# Patient Record
Sex: FEMALE | Race: BLACK OR AFRICAN AMERICAN | NOT HISPANIC OR LATINO | URBAN - METROPOLITAN AREA
[De-identification: names, ages, dates, MRNs, and addresses within clinical notes are randomized per-mention and may not be internally consistent; named-entity substitution may affect disease eponyms.]

---

## 2024-03-04 ENCOUNTER — OV NP (OUTPATIENT)
Dept: URBAN - METROPOLITAN AREA CLINIC 48 | Facility: CLINIC | Age: 44
End: 2024-03-04

## 2024-06-04 ENCOUNTER — OFFICE VISIT (OUTPATIENT)
Dept: URBAN - METROPOLITAN AREA CLINIC 48 | Facility: CLINIC | Age: 44
End: 2024-06-04
Payer: COMMERCIAL

## 2024-06-04 ENCOUNTER — LAB OUTSIDE AN ENCOUNTER (OUTPATIENT)
Dept: URBAN - METROPOLITAN AREA CLINIC 48 | Facility: CLINIC | Age: 44
End: 2024-06-04

## 2024-06-04 ENCOUNTER — DASHBOARD ENCOUNTERS (OUTPATIENT)
Age: 44
End: 2024-06-04

## 2024-06-04 VITALS
HEIGHT: 69 IN | OXYGEN SATURATION: 98 % | SYSTOLIC BLOOD PRESSURE: 129 MMHG | TEMPERATURE: 97.5 F | BODY MASS INDEX: 32.44 KG/M2 | HEART RATE: 74 BPM | DIASTOLIC BLOOD PRESSURE: 90 MMHG | WEIGHT: 219 LBS

## 2024-06-04 DIAGNOSIS — R14.0 ABDOMINAL BLOATING: ICD-10-CM

## 2024-06-04 DIAGNOSIS — R19.4 CHANGE IN BOWEL HABITS: ICD-10-CM

## 2024-06-04 DIAGNOSIS — D50.8 ACHLORHYDRIC ANEMIA: ICD-10-CM

## 2024-06-04 DIAGNOSIS — K21.9 GASTROESOPHAGEAL REFLUX DISEASE WITHOUT ESOPHAGITIS: ICD-10-CM

## 2024-06-04 PROBLEM — 116289008: Status: ACTIVE | Noted: 2024-06-04

## 2024-06-04 PROBLEM — 724556004: Status: ACTIVE | Noted: 2024-06-04

## 2024-06-04 PROBLEM — 88111009: Status: ACTIVE | Noted: 2024-06-04

## 2024-06-04 PROBLEM — 444551008: Status: ACTIVE | Noted: 2024-06-04

## 2024-06-04 PROBLEM — 266435005: Status: ACTIVE | Noted: 2024-06-04

## 2024-06-04 PROCEDURE — 99204 OFFICE O/P NEW MOD 45 MIN: CPT | Performed by: INTERNAL MEDICINE

## 2024-06-04 RX ORDER — AZELASTINE HYDROCHLORIDE 137 UG/1
1 PUFF IN EACH NOSTRIL SPRAY, METERED NASAL TWICE A DAY
Refills: 0 | Status: ACTIVE | COMMUNITY

## 2024-06-04 RX ORDER — DICYCLOMINE HYDROCHLORIDE 10 MG/1
1 CAPSULE 30 MINUTES BEFORE EATING CAPSULE ORAL THREE TIMES A DAY
Refills: 0 | Status: ACTIVE | COMMUNITY

## 2024-06-04 RX ORDER — HYDROXYZINE PAMOATE 25 MG/1
1 CAPSULE AT BEDTIME AS NEEDED CAPSULE ORAL ONCE A DAY
Refills: 0 | Status: ACTIVE | COMMUNITY

## 2024-06-04 RX ORDER — COLESTIPOL HYDROCHLORIDE 1 G/1
TAKE 1-2 TABLETS 2 HOURS AWAY FROM OTHER MEDICATION TABLET, FILM COATED ORAL ONCE A DAY
Qty: 60 | Refills: 3 | OUTPATIENT
Start: 2024-06-04

## 2024-06-04 RX ORDER — RIZATRIPTAN BENZOATE 10 MG/1
1 TABLET TABLET ORAL ONCE A DAY
Refills: 1 | Status: ACTIVE | COMMUNITY

## 2024-06-04 RX ORDER — IBUPROFEN 800 MG/1
1 TABLET WITH FOOD OR MILK AS NEEDED TABLET, FILM COATED ORAL
Refills: 0 | Status: ACTIVE | COMMUNITY

## 2024-06-04 RX ORDER — PROPRANOLOL HYDROCHLORIDE 60 MG/1
1 CAPSULE CAPSULE, EXTENDED RELEASE ORAL ONCE A DAY
Refills: 1 | Status: ACTIVE | COMMUNITY

## 2024-06-04 RX ORDER — ALBUTEROL SULFATE AND BUDESONIDE 90; 80 UG/1; UG/1
2 PUFFS AS NEEDED AEROSOL, METERED RESPIRATORY (INHALATION)
Refills: 1 | Status: ACTIVE | COMMUNITY

## 2024-06-04 RX ORDER — ESOMEPRAZOLE MAGNESIUM 40 MG/1
1 CAPSULE CAPSULE, DELAYED RELEASE ORAL ONCE A DAY
Qty: 30 | Refills: 3 | OUTPATIENT
Start: 2024-06-04

## 2024-06-04 RX ORDER — TRAMADOL HYDROCHLORIDE 50 MG/1
1 TABLET AS NEEDED TABLET ORAL ONCE A DAY
Refills: 0 | Status: ACTIVE | COMMUNITY

## 2024-06-04 RX ORDER — DULOXETINE HYDROCHLORIDE 30 MG/1
1 CAPSULE CAPSULE, DELAYED RELEASE PELLETS ORAL ONCE A DAY
Refills: 0 | Status: ACTIVE | COMMUNITY

## 2024-06-04 RX ORDER — SEMAGLUTIDE 1.34 MG/ML
AS DIRECTED INJECTION, SOLUTION SUBCUTANEOUS
Status: ACTIVE | COMMUNITY

## 2024-06-04 NOTE — HPI-TODAY'S VISIT:
43 year old female presents for evaluation of IBS and anemia. She is followed by hematology and received her last IV iron in 3/2024. Admits to heavy menses. 1/2024 iron 17, TIBC 426, iron sat 4%, ferritin 2. Hgb 11.2, MCV 87. Bowel movements are often urgent of loose stool and multiple times a day with some nocturnal incontinence. She takes Dicyclomine once daily and it helps. Certain foods cause an increase of symptoms. GERD is mostly controlled on Famotidine and Omeprazole. She also reports a vitamin D deficiency. The patient has been on Ozempic since 2021. She has stopped it in the past but issues continue. The patient has a history of GENNARO elevation and was seen by Dr. Kirkland/rheumatology. She was diagnosed with connective tissue disease. The patient has a history of fibromyalgia. Her aunt has a colostomy bag but otherwise denies GI disease in the family.

## 2024-06-12 ENCOUNTER — LAB OUTSIDE AN ENCOUNTER (OUTPATIENT)
Dept: URBAN - METROPOLITAN AREA CLINIC 46 | Facility: CLINIC | Age: 44
End: 2024-06-12

## 2024-06-14 LAB
ADENOVIRUS F 40/41: NOT DETECTED
CALPROTECTIN, STOOL - QDX: (no result)
CAMPYLOBACTER: NOT DETECTED
CLOSTRIDIUM DIFFICILE: NOT DETECTED
ENTAMOEBA HISTOLYTICA: NOT DETECTED
ENTEROAGGREGATIVE E.COLI: NOT DETECTED
ENTEROTOXIGENIC E.COLI: NOT DETECTED
ESCHERICHIA COLI O157: NOT DETECTED
GIARDIA LAMBLIA: NOT DETECTED
NOROVIRUS GI/GII: NOT DETECTED
PANCREATICELASTASE ELISA, STOOL: (no result)
ROTAVIRUS A: NOT DETECTED
SALMONELLA SPP.: NOT DETECTED
SHIGA-LIKE TOXIN PRODUCING E.COLI: NOT DETECTED
SHIGELLA SPP. / ENTEROINVASIVE E.COLI: NOT DETECTED
VIBRIO PARAHAEMOLYTICUS: NOT DETECTED
VIBRIO SPP.: NOT DETECTED
YERSINIA ENTEROCOLITICA: NOT DETECTED

## 2024-06-17 ENCOUNTER — TELEPHONE ENCOUNTER (OUTPATIENT)
Dept: URBAN - METROPOLITAN AREA CLINIC 46 | Facility: CLINIC | Age: 44
End: 2024-06-17

## 2024-06-17 ENCOUNTER — LAB OUTSIDE AN ENCOUNTER (OUTPATIENT)
Dept: URBAN - METROPOLITAN AREA CLINIC 46 | Facility: CLINIC | Age: 44
End: 2024-06-17

## 2024-06-17 PROBLEM — 37992001: Status: ACTIVE | Noted: 2024-06-17

## 2024-06-17 RX ORDER — PANCRELIPASE LIPASE, PANCRELIPASE PROTEASE, PANCRELIPASE AMYLASE 40000; 126000; 168000 [USP'U]/1; [USP'U]/1; [USP'U]/1
TAKE 2-3 WITH A MEAL AND 1 WITH A SNACK CAPSULE, DELAYED RELEASE ORAL DAILY
Qty: 300 | Refills: 5 | OUTPATIENT
Start: 2024-06-17 | End: 2024-12-13

## 2024-06-18 ENCOUNTER — TELEPHONE ENCOUNTER (OUTPATIENT)
Dept: URBAN - METROPOLITAN AREA CLINIC 46 | Facility: CLINIC | Age: 44
End: 2024-06-18

## 2024-06-27 ENCOUNTER — TELEPHONE ENCOUNTER (OUTPATIENT)
Dept: URBAN - METROPOLITAN AREA CLINIC 45 | Facility: CLINIC | Age: 44
End: 2024-06-27

## 2024-08-12 ENCOUNTER — OFFICE VISIT (OUTPATIENT)
Dept: URBAN - METROPOLITAN AREA TELEHEALTH 2 | Facility: TELEHEALTH | Age: 44
End: 2024-08-12
Payer: COMMERCIAL

## 2024-08-12 ENCOUNTER — LAB OUTSIDE AN ENCOUNTER (OUTPATIENT)
Dept: URBAN - METROPOLITAN AREA TELEHEALTH 2 | Facility: TELEHEALTH | Age: 44
End: 2024-08-12

## 2024-08-12 VITALS — BODY MASS INDEX: 31.84 KG/M2 | HEIGHT: 69 IN | WEIGHT: 215 LBS

## 2024-08-12 DIAGNOSIS — K86.89 PANCREATIC INSUFFICIENCY: ICD-10-CM

## 2024-08-12 DIAGNOSIS — R14.0 ABDOMINAL BLOATING: ICD-10-CM

## 2024-08-12 DIAGNOSIS — K21.9 GASTROESOPHAGEAL REFLUX DISEASE WITHOUT ESOPHAGITIS: ICD-10-CM

## 2024-08-12 DIAGNOSIS — R19.4 CHANGE IN BOWEL HABITS: ICD-10-CM

## 2024-08-12 PROBLEM — 21101000119105: Status: ACTIVE | Noted: 2024-08-12

## 2024-08-12 PROCEDURE — 99214 OFFICE O/P EST MOD 30 MIN: CPT | Performed by: INTERNAL MEDICINE

## 2024-08-12 PROCEDURE — 99443 PHONE E/M BY PHYS 21-30 MIN: CPT | Performed by: INTERNAL MEDICINE

## 2024-08-12 RX ORDER — PROPRANOLOL HYDROCHLORIDE 60 MG/1
1 CAPSULE CAPSULE, EXTENDED RELEASE ORAL ONCE A DAY
Refills: 1 | Status: ACTIVE | COMMUNITY

## 2024-08-12 RX ORDER — PANCRELIPASE LIPASE, PANCRELIPASE PROTEASE, PANCRELIPASE AMYLASE 40000; 126000; 168000 [USP'U]/1; [USP'U]/1; [USP'U]/1
TAKE 2-3 WITH A MEAL AND 1 WITH A SNACK CAPSULE, DELAYED RELEASE ORAL DAILY
Qty: 300 | Refills: 5 | Status: ON HOLD | COMMUNITY
Start: 2024-06-17 | End: 2024-12-13

## 2024-08-12 RX ORDER — IBUPROFEN 800 MG/1
1 TABLET WITH FOOD OR MILK AS NEEDED TABLET, FILM COATED ORAL
Refills: 0 | Status: ACTIVE | COMMUNITY

## 2024-08-12 RX ORDER — BUPROPION HYDROCHLORIDE 100 MG/1
1 TABLET IN THE MORNING TABLET, FILM COATED ORAL ONCE A DAY
Status: ACTIVE | COMMUNITY

## 2024-08-12 RX ORDER — ALBUTEROL SULFATE AND BUDESONIDE 90; 80 UG/1; UG/1
2 PUFFS AS NEEDED AEROSOL, METERED RESPIRATORY (INHALATION)
Refills: 1 | Status: ACTIVE | COMMUNITY

## 2024-08-12 RX ORDER — TRAMADOL HYDROCHLORIDE 50 MG/1
1 TABLET AS NEEDED TABLET ORAL ONCE A DAY
Refills: 0 | Status: ACTIVE | COMMUNITY

## 2024-08-12 RX ORDER — COLESTIPOL HYDROCHLORIDE 1 G/1
TAKE 1-2 TABLETS 2 HOURS AWAY FROM OTHER MEDICATION TABLET, FILM COATED ORAL ONCE A DAY
Qty: 60 | Refills: 3 | Status: ON HOLD | COMMUNITY
Start: 2024-06-04

## 2024-08-12 RX ORDER — SEMAGLUTIDE 1.34 MG/ML
AS DIRECTED INJECTION, SOLUTION SUBCUTANEOUS
Status: ACTIVE | COMMUNITY

## 2024-08-12 RX ORDER — HYDROXYZINE PAMOATE 25 MG/1
1 CAPSULE AT BEDTIME AS NEEDED CAPSULE ORAL ONCE A DAY
Refills: 0 | Status: ACTIVE | COMMUNITY

## 2024-08-12 RX ORDER — AZELASTINE HYDROCHLORIDE 137 UG/1
1 PUFF IN EACH NOSTRIL SPRAY, METERED NASAL TWICE A DAY
Refills: 0 | Status: ACTIVE | COMMUNITY

## 2024-08-12 RX ORDER — RIZATRIPTAN BENZOATE 10 MG/1
1 TABLET TABLET ORAL ONCE A DAY
Refills: 1 | Status: ACTIVE | COMMUNITY

## 2024-08-12 RX ORDER — ESOMEPRAZOLE MAGNESIUM 40 MG/1
1 CAPSULE CAPSULE, DELAYED RELEASE ORAL ONCE A DAY
Qty: 30 | Refills: 3 | Status: ACTIVE | COMMUNITY
Start: 2024-06-04

## 2024-08-12 NOTE — HPI-TODAY'S VISIT:
43 year old female presents for follow up of IBS and anemia. She is followed by hematology and received her last IV iron in 3/2024. Admits to heavy menses. 1/2024 iron 17, TIBC 426, iron sat 4%, ferritin 2. Hgb 11.2, MCV 87. Bowel movements were often urgent of loose stool and multiple times a day with some nocturnal incontinence. Stool studies 6/2024 showed pancreatic insufficiency. She was started on Zenpep (2-3 with a meal) and it helped but she feels it is not helping as much now with episodes of urgency. Colestipol helps the most with bulking her stool. CT abd/pelvis was ordered at last visit but she did not do it because of expense. The patient has been on Ozempic since 2021. She has stopped it in the past but issues continued. At times, she has formed stool. The patient takes Dicyclomine prn and it helps with abdominal pain. Certain foods cause an increase of symptoms. GERD has worsened on Esomeprazole 40mg daily but she used to be on Famotidine and Omeprazole. She also reports a vitamin D deficiency. The patient has a history of GENNARO elevation and was seen by Dr. Kirkland/rheumatology. She was diagnosed with connective tissue disease.
done

## 2024-09-20 ENCOUNTER — OFFICE VISIT (OUTPATIENT)
Dept: URBAN - METROPOLITAN AREA SURGERY CENTER 27 | Facility: SURGERY CENTER | Age: 44
End: 2024-09-20

## 2024-10-11 ENCOUNTER — OFFICE VISIT (OUTPATIENT)
Dept: URBAN - METROPOLITAN AREA SURGERY CENTER 27 | Facility: SURGERY CENTER | Age: 44
End: 2024-10-11

## 2024-11-27 ENCOUNTER — OFFICE VISIT (OUTPATIENT)
Dept: URBAN - METROPOLITAN AREA MEDICAL CENTER 35 | Facility: MEDICAL CENTER | Age: 44
End: 2024-11-27

## 2025-06-02 ENCOUNTER — ERX REFILL RESPONSE (OUTPATIENT)
Dept: URBAN - METROPOLITAN AREA CLINIC 44 | Facility: CLINIC | Age: 45
End: 2025-06-02

## 2025-06-02 RX ORDER — ESOMEPRAZOLE MAGNESIUM 40 MG/1
TAKE ONE CAPSULE BY MOUTH ONE TIME DAILY CAPSULE, DELAYED RELEASE ORAL
Qty: 30 CAPSULE | Refills: 3 | OUTPATIENT

## 2025-06-02 RX ORDER — ESOMEPRAZOLE MAGNESIUM 40 MG/1
1 CAPSULE CAPSULE, DELAYED RELEASE ORAL ONCE A DAY
Qty: 30 | Refills: 3 | OUTPATIENT